# Patient Record
Sex: FEMALE | Race: OTHER | NOT HISPANIC OR LATINO
[De-identification: names, ages, dates, MRNs, and addresses within clinical notes are randomized per-mention and may not be internally consistent; named-entity substitution may affect disease eponyms.]

---

## 2017-02-03 ENCOUNTER — APPOINTMENT (OUTPATIENT)
Dept: BARIATRICS | Facility: CLINIC | Age: 32
End: 2017-02-03

## 2017-06-06 ENCOUNTER — MESSAGE (OUTPATIENT)
Age: 32
End: 2017-06-06

## 2018-05-31 ENCOUNTER — MESSAGE (OUTPATIENT)
Age: 33
End: 2018-05-31

## 2019-01-01 ENCOUNTER — OUTPATIENT (OUTPATIENT)
Dept: OUTPATIENT SERVICES | Facility: HOSPITAL | Age: 34
LOS: 1 days | End: 2019-01-01
Payer: MEDICARE

## 2019-01-01 PROCEDURE — G9001: CPT

## 2019-01-16 DIAGNOSIS — Z71.89 OTHER SPECIFIED COUNSELING: ICD-10-CM

## 2020-02-12 ENCOUNTER — APPOINTMENT (OUTPATIENT)
Dept: BARIATRICS | Facility: CLINIC | Age: 35
End: 2020-02-12
Payer: MEDICARE

## 2020-02-12 VITALS
WEIGHT: 245.5 LBS | DIASTOLIC BLOOD PRESSURE: 79 MMHG | OXYGEN SATURATION: 100 % | TEMPERATURE: 207.32 F | HEART RATE: 69 BPM | SYSTOLIC BLOOD PRESSURE: 130 MMHG | HEIGHT: 66 IN | BODY MASS INDEX: 39.46 KG/M2

## 2020-02-12 PROCEDURE — 99202 OFFICE O/P NEW SF 15 MIN: CPT
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
## 2020-02-12 NOTE — PHYSICAL EXAM
[Obese, well nourished, in no acute distress] : obese, well nourished, in no acute distress [Normal] : grossly intact [de-identified] : soft no peritoneal signs voluntary guarding on rt side

## 2020-02-28 ENCOUNTER — APPOINTMENT (OUTPATIENT)
Dept: BARIATRICS | Facility: CLINIC | Age: 35
End: 2020-02-28
Payer: MEDICARE

## 2020-02-28 VITALS
BODY MASS INDEX: 39.96 KG/M2 | SYSTOLIC BLOOD PRESSURE: 127 MMHG | DIASTOLIC BLOOD PRESSURE: 86 MMHG | HEART RATE: 93 BPM | WEIGHT: 247.6 LBS

## 2020-02-28 DIAGNOSIS — F32.9 ANXIETY DISORDER, UNSPECIFIED: ICD-10-CM

## 2020-02-28 DIAGNOSIS — F41.9 ANXIETY DISORDER, UNSPECIFIED: ICD-10-CM

## 2020-02-28 DIAGNOSIS — G43.909 MIGRAINE, UNSPECIFIED, NOT INTRACTABLE, W/OUT STATUS MIGRAINOSUS: ICD-10-CM

## 2020-02-28 DIAGNOSIS — Z78.9 OTHER SPECIFIED HEALTH STATUS: ICD-10-CM

## 2020-02-28 PROCEDURE — 99205 OFFICE O/P NEW HI 60 MIN: CPT

## 2020-02-28 RX ORDER — SERTRALINE HYDROCHLORIDE 100 MG/1
100 TABLET, FILM COATED ORAL
Refills: 0 | Status: ACTIVE | COMMUNITY
Start: 2020-02-28

## 2020-02-28 RX ORDER — BUSPIRONE HYDROCHLORIDE 10 MG/1
10 TABLET ORAL
Refills: 0 | Status: ACTIVE | COMMUNITY
Start: 2020-02-28

## 2020-02-28 RX ORDER — EPINEPHRINE 0.3 MG/.3ML
0.3 INJECTION INTRAMUSCULAR
Refills: 0 | Status: ACTIVE | COMMUNITY
Start: 2020-02-28

## 2020-02-28 RX ORDER — TRAZODONE HYDROCHLORIDE 50 MG/1
50 TABLET ORAL
Refills: 0 | Status: ACTIVE | COMMUNITY
Start: 2020-02-28

## 2020-02-28 RX ORDER — BUTALBITAL AND ACETAMINOPHEN 325; 25 MG/1; MG/1
25-325 TABLET ORAL
Refills: 0 | Status: ACTIVE | COMMUNITY
Start: 2020-02-28
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
## 2020-02-28 NOTE — ASSESSMENT
[FreeTextEntry1] : Patient to have labs drawn\par Will speak to her psychiatrist about increasing Wellbutrin dose to 300mg/day- closer to Contrave dose\par No obesity medicine benefits on insurance- can try topirimate and phentermine- provided Qsymia information for her to review\par Will get an IUD with her GYN\par Referred to Dr. Coleman for her son- [place]- can see RD there or will refer to RD at [facility] or at [facility]\par Encouraged to eat 3 meals/day, decrease carbs increase protein and vegetables, committed to looking into a gym membership and feasibility with childcare, will track intake using [employer] \par Will call patient with results (going to [facility])\par RTO 1 month after starting meds \par

## 2020-05-22 RX ORDER — NALTREXONE HYDROCHLORIDE AND BUPROPION HYDROCHLORIDE 8; 90 MG/1; MG/1
8-90 TABLET, EXTENDED RELEASE ORAL
Qty: 120 | Refills: 0 | Status: COMPLETED | COMMUNITY
Start: 2020-05-22

## 2020-05-22 RX ORDER — BLOOD-GLUCOSE METER
KIT MISCELLANEOUS
Qty: 1 | Refills: 0 | Status: COMPLETED | COMMUNITY
Start: 2020-05-22

## 2020-05-27 RX ORDER — NALTREXONE HYDROCHLORIDE AND BUPROPION HYDROCHLORIDE 8; 90 MG/1; MG/1
8-90 TABLET, EXTENDED RELEASE ORAL
Qty: 120 | Refills: 0 | Status: COMPLETED | COMMUNITY
Start: 2020-05-27

## 2020-05-27 RX ORDER — BLOOD-GLUCOSE METER
KIT MISCELLANEOUS
Qty: 1 | Refills: 0 | Status: ACTIVE | COMMUNITY
Start: 2020-05-27 | End: 1900-01-01

## 2020-06-17 ENCOUNTER — APPOINTMENT (OUTPATIENT)
Dept: BARIATRICS | Facility: CLINIC | Age: 35
End: 2020-06-17
Payer: MEDICARE

## 2020-06-17 VITALS — HEIGHT: 66 IN | WEIGHT: 245 LBS | BODY MASS INDEX: 39.37 KG/M2

## 2020-06-17 DIAGNOSIS — Z87.898 PERSONAL HISTORY OF OTHER SPECIFIED CONDITIONS: ICD-10-CM

## 2020-06-17 PROCEDURE — 99215 OFFICE O/P EST HI 40 MIN: CPT | Mod: 95

## 2020-06-17 RX ORDER — NALTREXONE HYDROCHLORIDE AND BUPROPION HYDROCHLORIDE 8; 90 MG/1; MG/1
8-90 TABLET, EXTENDED RELEASE ORAL
Qty: 120 | Refills: 0 | Status: ACTIVE | COMMUNITY
Start: 2020-05-27 | End: 1900-01-01

## 2020-06-17 RX ORDER — BUPROPION HYDROCHLORIDE 150 MG/1
150 TABLET, EXTENDED RELEASE ORAL DAILY
Qty: 30 | Refills: 5 | Status: DISCONTINUED | COMMUNITY
Start: 2020-02-28 | End: 2020-06-17

## 2020-06-17 NOTE — REASON FOR VISIT
[Home] : at home, [unfilled] , at the time of the visit. [Other Location: e.g. Home (Enter Location, City,State)___] : at [unfilled] [Verbal consent obtained from patient] : the patient, [unfilled] [Obesity] : obesity
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
## 2020-07-22 ENCOUNTER — APPOINTMENT (OUTPATIENT)
Dept: BARIATRICS | Facility: CLINIC | Age: 35
End: 2020-07-22
Payer: MEDICARE

## 2020-07-22 VITALS — BODY MASS INDEX: 40.82 KG/M2 | HEIGHT: 66 IN | WEIGHT: 254 LBS

## 2020-07-22 DIAGNOSIS — Z87.898 PERSONAL HISTORY OF OTHER SPECIFIED CONDITIONS: ICD-10-CM

## 2020-07-22 DIAGNOSIS — R63.5 ABNORMAL WEIGHT GAIN: ICD-10-CM

## 2020-07-22 DIAGNOSIS — Z00.00 ENCOUNTER FOR GENERAL ADULT MEDICAL EXAMINATION W/OUT ABNORMAL FINDINGS: ICD-10-CM

## 2020-07-22 DIAGNOSIS — R42 DIZZINESS AND GIDDINESS: ICD-10-CM

## 2020-07-22 PROCEDURE — 99214 OFFICE O/P EST MOD 30 MIN: CPT | Mod: 95
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
## 2020-07-22 NOTE — ASSESSMENT
[FreeTextEntry1] : Dietary changes: educated on importance of decreasing carbohydrate intake, discussed hidden calories in dressings. Still interested in seeing RD- reschedule an appt \par Exercise: continue daily walks and play sessions at the park with children- encouraged adding light weight exercises\par Medication: can discontinue contrave and go back to Wellbutrin 300 mg daily. Discussed option for GLP1(saxenda or victoza). Pt does not want to pay for another weight loss medication at this time & would like to discuss options for bariatric surgery\par Bariatric sx- referral back to Dr. Lomeli (bariatric surgeon) for revision surgery options. \par Mental Health: pt has a scheduled appt with psychiatrist this weekend (gets Wellbutrin medication renewal there). Continue to see family therapist weekly for PTSD.

## 2020-08-05 ENCOUNTER — APPOINTMENT (OUTPATIENT)
Dept: BARIATRICS | Facility: CLINIC | Age: 35
End: 2020-08-05
Payer: MEDICARE

## 2020-08-05 VITALS — HEIGHT: 66 IN | WEIGHT: 255 LBS | BODY MASS INDEX: 40.98 KG/M2

## 2020-08-07 ENCOUNTER — APPOINTMENT (OUTPATIENT)
Dept: BARIATRICS | Facility: CLINIC | Age: 35
End: 2020-08-07
Payer: MEDICARE

## 2020-08-07 PROCEDURE — 99213 OFFICE O/P EST LOW 20 MIN: CPT | Mod: 95

## 2020-08-07 NOTE — HISTORY OF PRESENT ILLNESS
[Home] : at home, [unfilled] , at the time of the visit. [Medical Office: ([facility])___] : at the medical office located in  [Other:____] : [unfilled] [de-identified] : Trinidad Bauman has been followed by us for the last 6 months. S/p sleeve with recidivism. Has been working with the medical weight loss people diligently. Mental health has improved. States financially able to afford supplements. She looks much better. At this point, it is reasonable to move forward and convert sleeve to DS. Ready to make lifestyle adaptions. Has been followed by our medical weight loss people for lengthy period. For conversion, will start iron and vitamin D in preparation. Does require EGD and UGI.

## 2020-08-07 NOTE — PLAN
[FreeTextEntry1] : Get UGI series\par Get endoscopy\par See our psychologist\par Get a note from her own psychologist\par Plan to convert sleeve to DS

## 2020-08-26 ENCOUNTER — APPOINTMENT (OUTPATIENT)
Dept: BARIATRICS | Facility: CLINIC | Age: 35
End: 2020-08-26
Payer: MEDICARE

## 2020-08-26 PROCEDURE — 90791 PSYCH DIAGNOSTIC EVALUATION: CPT

## 2020-09-08 DIAGNOSIS — E55.9 VITAMIN D DEFICIENCY, UNSPECIFIED: ICD-10-CM

## 2020-09-08 DIAGNOSIS — E50.9 VITAMIN A DEFICIENCY, UNSPECIFIED: ICD-10-CM

## 2020-09-09 ENCOUNTER — TRANSCRIPTION ENCOUNTER (OUTPATIENT)
Age: 35
End: 2020-09-09

## 2020-09-14 ENCOUNTER — APPOINTMENT (OUTPATIENT)
Dept: BARIATRICS | Facility: CLINIC | Age: 35
End: 2020-09-14
Payer: MEDICARE

## 2020-09-14 PROCEDURE — 97802 MEDICAL NUTRITION INDIV IN: CPT | Mod: 95

## 2020-11-09 ENCOUNTER — NON-APPOINTMENT (OUTPATIENT)
Age: 35
End: 2020-11-09

## 2020-11-10 DIAGNOSIS — E61.1 IRON DEFICIENCY: ICD-10-CM

## 2020-11-13 ENCOUNTER — APPOINTMENT (OUTPATIENT)
Dept: BARIATRICS | Facility: CLINIC | Age: 35
End: 2020-11-13
Payer: MEDICARE

## 2020-11-13 VITALS
HEIGHT: 66 IN | DIASTOLIC BLOOD PRESSURE: 84 MMHG | WEIGHT: 265 LBS | SYSTOLIC BLOOD PRESSURE: 130 MMHG | BODY MASS INDEX: 42.59 KG/M2 | HEART RATE: 83 BPM

## 2020-11-13 DIAGNOSIS — K21.9 GASTRO-ESOPHAGEAL REFLUX DISEASE W/OUT ESOPHAGITIS: ICD-10-CM

## 2020-11-13 DIAGNOSIS — E66.01 MORBID (SEVERE) OBESITY DUE TO EXCESS CALORIES: ICD-10-CM

## 2020-11-13 PROCEDURE — 99213 OFFICE O/P EST LOW 20 MIN: CPT

## 2020-11-13 NOTE — HISTORY OF PRESENT ILLNESS
[de-identified] : s/p band removal to sleeve and now for conversion to mds or rygb \par decision will be based on egd on Monday\par on supplements\par mental health seems much more stable\par understands the pre op instructions which were given today\par all risks and benefits explained\par reviewed Iron and level acceptable

## 2020-11-17 ENCOUNTER — NON-APPOINTMENT (OUTPATIENT)
Age: 35
End: 2020-11-17

## 2020-11-19 ENCOUNTER — APPOINTMENT (OUTPATIENT)
Dept: BARIATRICS | Facility: HOSPITAL | Age: 35
End: 2020-11-19

## 2020-11-23 ENCOUNTER — NON-APPOINTMENT (OUTPATIENT)
Age: 35
End: 2020-11-23

## 2020-12-04 ENCOUNTER — APPOINTMENT (OUTPATIENT)
Dept: BARIATRICS | Facility: CLINIC | Age: 35
End: 2020-12-04
Payer: MEDICARE

## 2020-12-04 VITALS — WEIGHT: 265 LBS | BODY MASS INDEX: 42.77 KG/M2

## 2020-12-04 DIAGNOSIS — Z98.84 BARIATRIC SURGERY STATUS: ICD-10-CM

## 2020-12-04 PROCEDURE — 97803 MED NUTRITION INDIV SUBSEQ: CPT | Mod: 95

## 2020-12-04 NOTE — REASON FOR VISIT
[Home] : at home, [unfilled] , at the time of the visit. [Other Location: e.g. Home (Enter Location, City,State)___] : at [unfilled] [de-identified] : Procedure MDS\par DOS 11/18/20

## 2020-12-04 NOTE — ASSESSMENT
[de-identified] : 35 year old female s/p MDS 3 weeks ago\par Patient to continue to advance diet as recommended by RD.  Continue supplements and vitamins.  Continue adequate water intake.\par Continue to take Tylenol for pain management PRN.  Will reorder omeprazole for GERD.

## 2020-12-04 NOTE — HISTORY OF PRESENT ILLNESS
[de-identified] : Patient has lost 20 pounds.  Moving her bowels twice daily- watery with cramping immediately prior.  Having some urgency to use the bathroom for BM's.  REports adequate water intake and taking supplements as prescribed.  Taking tylenol PRN for pain with inadequate relief prior to BM's.  +GERD now off omeprazole.  +Walking.

## 2020-12-06 RX ORDER — ERGOCALCIFEROL 1.25 MG/1
1.25 MG CAPSULE, LIQUID FILLED ORAL
Qty: 12 | Refills: 0 | Status: ACTIVE | COMMUNITY
Start: 2020-09-08 | End: 1900-01-01

## 2020-12-14 ENCOUNTER — NON-APPOINTMENT (OUTPATIENT)
Age: 35
End: 2020-12-14

## 2021-10-01 ENCOUNTER — RX RENEWAL (OUTPATIENT)
Age: 36
End: 2021-10-01

## 2021-10-01 RX ORDER — OMEPRAZOLE 20 MG/1
20 CAPSULE, DELAYED RELEASE ORAL DAILY
Qty: 30 | Refills: 5 | Status: ACTIVE | COMMUNITY
Start: 2020-12-04 | End: 1900-01-01